# Patient Record
Sex: MALE | Race: BLACK OR AFRICAN AMERICAN | NOT HISPANIC OR LATINO | Employment: UNEMPLOYED | URBAN - METROPOLITAN AREA
[De-identification: names, ages, dates, MRNs, and addresses within clinical notes are randomized per-mention and may not be internally consistent; named-entity substitution may affect disease eponyms.]

---

## 2024-09-19 ENCOUNTER — HOSPITAL ENCOUNTER (EMERGENCY)
Facility: HOSPITAL | Age: 29
Discharge: HOME | End: 2024-09-19
Payer: COMMERCIAL

## 2024-09-19 VITALS
TEMPERATURE: 98 F | HEART RATE: 68 BPM | BODY MASS INDEX: 31.15 KG/M2 | DIASTOLIC BLOOD PRESSURE: 81 MMHG | RESPIRATION RATE: 16 BRPM | OXYGEN SATURATION: 99 % | SYSTOLIC BLOOD PRESSURE: 121 MMHG | HEIGHT: 72 IN | WEIGHT: 230 LBS

## 2024-09-19 DIAGNOSIS — R21 RASH: Primary | ICD-10-CM

## 2024-09-19 DIAGNOSIS — R21 PENILE RASH: ICD-10-CM

## 2024-09-19 LAB
C TRACH RRNA SPEC QL NAA+PROBE: NEGATIVE
HERPES SIMPLEX VIRUS 1 IGG: 0.3 INDEX
HERPES SIMPLEX VIRUS 2 IGG: <0.2 INDEX
HIV 1+2 AB+HIV1 P24 AG SERPL QL IA: NONREACTIVE
N GONORRHOEA DNA SPEC QL PROBE+SIG AMP: NEGATIVE
T VAGINALIS RRNA SPEC QL NAA+PROBE: NEGATIVE
TREPONEMA PALLIDUM IGG+IGM AB [PRESENCE] IN SERUM OR PLASMA BY IMMUNOASSAY: NONREACTIVE

## 2024-09-19 PROCEDURE — 99284 EMERGENCY DEPT VISIT MOD MDM: CPT | Performed by: NURSE PRACTITIONER

## 2024-09-19 PROCEDURE — 86695 HERPES SIMPLEX TYPE 1 TEST: CPT | Performed by: NURSE PRACTITIONER

## 2024-09-19 PROCEDURE — 99283 EMERGENCY DEPT VISIT LOW MDM: CPT

## 2024-09-19 PROCEDURE — 86780 TREPONEMA PALLIDUM: CPT | Performed by: NURSE PRACTITIONER

## 2024-09-19 PROCEDURE — 87491 CHLMYD TRACH DNA AMP PROBE: CPT | Performed by: NURSE PRACTITIONER

## 2024-09-19 PROCEDURE — 36415 COLL VENOUS BLD VENIPUNCTURE: CPT | Performed by: NURSE PRACTITIONER

## 2024-09-19 PROCEDURE — 87389 HIV-1 AG W/HIV-1&-2 AB AG IA: CPT | Performed by: NURSE PRACTITIONER

## 2024-09-19 PROCEDURE — 87661 TRICHOMONAS VAGINALIS AMPLIF: CPT | Performed by: NURSE PRACTITIONER

## 2024-09-19 ASSESSMENT — PAIN SCALES - GENERAL
PAINLEVEL_OUTOF10: 0 - NO PAIN
PAINLEVEL_OUTOF10: 0 - NO PAIN

## 2024-09-19 ASSESSMENT — PAIN - FUNCTIONAL ASSESSMENT: PAIN_FUNCTIONAL_ASSESSMENT: 0-10

## 2024-09-19 ASSESSMENT — COLUMBIA-SUICIDE SEVERITY RATING SCALE - C-SSRS
2. HAVE YOU ACTUALLY HAD ANY THOUGHTS OF KILLING YOURSELF?: NO
6. HAVE YOU EVER DONE ANYTHING, STARTED TO DO ANYTHING, OR PREPARED TO DO ANYTHING TO END YOUR LIFE?: NO
1. IN THE PAST MONTH, HAVE YOU WISHED YOU WERE DEAD OR WISHED YOU COULD GO TO SLEEP AND NOT WAKE UP?: NO

## 2024-09-19 ASSESSMENT — PAIN DESCRIPTION - PAIN TYPE: TYPE: ACUTE PAIN

## 2024-09-19 NOTE — ED PROVIDER NOTES
Emergency Department Encounter  [facility] EMERGENCY MEDICINE    Patient: Roc Clay  MRN: [id number]  : 1995  Date of Evaluation: 2024  ED Provider: FLACO Vail      Chief Complaint       Chief Complaint   Patient presents with    STD check     [employer] [place] [employer] [place] [employer] [place]    (Location/Symptom, Timing/Onset, Context/Setting, Quality, Duration, Modifying Factors, Severity) Note limiting factors.   Limitations to History: none  Historian: self  Records reviewed: EMR inpatient and outpatient notes, Care Everywhere      Roc Clay is a 29 y.o. male who presents to the emergency department complaining of concern for STD, reports rash at the tip of his penis, states that it started off more blisterlike approximately 5 days ago and is now improved, was painful to touch.  Has had 2 sexual partners within the last 30 days, will sometimes use protection.  Denies any difficulty with urination, denies any fever, chills.  Denies any penile drainage, denies any known exposure to any STDs    ROS:     Review of Systems  14 systems reviewed and otherwise acutely negative except as in the [employer] [employer] [place] [employer] [place].          Past History   No past medical history on file.  No past surgical history on file.  Social History     Socioeconomic History    Marital status: Single       Medications/Allergies     Previous Medications    No medications on file     No Known Allergies     Physical Exam       ED Triage Vitals [24 0152]   Temperature Heart Rate Respirations BP   36.5 °C (97.7 °F) 75 18 148/89      Pulse Ox Temp src Heart Rate Source Patient Position   97 % -- -- Sitting      BP Location FiO2 (%)     -- --         Physical Exam    GENERAL:  The patient appears nourished and normally developed. Vital signs as documented.     HEENT:  Head normocephalic, atraumatic, EOMs intact, PERRLA, Mucous membranes moist. Nares patent without copious rhinorrhea.  No lymphadenopathy.    PULMONARY:  Lungs are clear to auscultation, without  any respiratory distress. Able to speak full sentences, no accessory muscle use    CARDIAC:   Normal rate. No murmurs, rubs or gallops    ABDOMEN:  Soft, non distended, non tender, BS positive x 4 quadrants, No rebound or guarding, no peritoneal signs, no CVA tenderness, no masses or organomegaly    : Excoriated dry rash with what appears to be healing vesicles in the area between the penile shaft and the corona    MUSCULOSKELETAL:   Able to ambulate, Non edematous, with no obvious deformities. Pulses intact distal    SKIN:   Good color, with no significant rashes.  No pallor.    NEURO:  No obvious neurological deficits, normal sensation and strength bilaterally.  Able to follow commands, NIH 0, CN 2-12 intact.        Diagnostics   Labs:  Labs Reviewed   TRICH VAGINALIS, AMPLIFIED   C. TRACHOMATIS + N. GONORRHOEAE, AMPLIFIED   HSV IGG1 AND IGG2 AB   HIV 1/2 ANTIGEN/ANTIBODY SCREEN WIH REFLEX TO CONFIRMATION   SYPHILIS SCREENING WITH REFLEX     Radiographs:  No orders to display             Assessment   In brief, Roc Clay is a 29 y.o. male who presented to the emergency department for rash to the penis    Plan   STD test    Differentials   Herpetic rash  Fungal rash  Other STD    ED Course     Diagnoses as of 09/19/24 0229   Rash   Penile rash       Visit Vitals  /89 (Patient Position: Sitting)   Pulse 75   Temp 36.5 °C (97.7 °F)   Resp 18   Ht 1.829 m (6')   Wt 104 kg (230 lb)   SpO2 97%   BMI 31.19 kg/m²   BSA 2.3 m²       Medications - No data to display    Plan of care discussed, patient is stable appearing, testing was ordered however results will not appear today, symptoms have been ongoing for approximately 5 days, if this is herpetic antivirals would not be indicated at this time.  Patient will be discharged home in stable condition, educated on any worsening signs and symptoms to return to the emergency department      Final Impression      1. Rash    2. Penile rash           DISPOSITION  Disposition: Discharge  Patient condition is: Stable    Comment: Please note this report has been produced using speech recognition software and may contain errors related to that system including errors in grammar, punctuation, and spelling, as well as words and phrases that may be inappropriate.  If there are any questions or concerns please feel free to contact the dictating provider for clarification.    FLACO Vail APRN-CNP  09/19/24 022

## 2024-09-19 NOTE — ED TRIAGE NOTES
Patient presents to the ED for a STD check. Patient reports that he felt like there was  ring around the tip of his penis that was burning that has resolved. He just wants to get checked out.